# Patient Record
Sex: MALE | Race: WHITE | NOT HISPANIC OR LATINO | Employment: FULL TIME | URBAN - METROPOLITAN AREA
[De-identification: names, ages, dates, MRNs, and addresses within clinical notes are randomized per-mention and may not be internally consistent; named-entity substitution may affect disease eponyms.]

---

## 2021-12-30 ENCOUNTER — TREATMENT (OUTPATIENT)
Dept: PHYSICAL THERAPY | Facility: CLINIC | Age: 56
End: 2021-12-30

## 2021-12-30 DIAGNOSIS — M25.511 RIGHT SHOULDER PAIN, UNSPECIFIED CHRONICITY: ICD-10-CM

## 2021-12-30 DIAGNOSIS — M75.81 TENDINITIS OF RIGHT ROTATOR CUFF: Primary | ICD-10-CM

## 2021-12-30 PROCEDURE — 97161 PT EVAL LOW COMPLEX 20 MIN: CPT | Performed by: PHYSICAL THERAPIST

## 2021-12-30 NOTE — PROGRESS NOTES
Physical Therapy Initial Evaluation and Plan of Care    Patient: Mitch Garcia   : 1965  Diagnosis/ICD-10 Code:  Tendinitis of right rotator cuff [M75.81], Right shoulder pain, unspecified chronicity [M25.511]   Referring practitioner: Colleen Fishman MD    Functional Outcome Measure: QuickDASH: 25% disability (regular module), 19% disability (work module)     Subjective Evaluation    History of Present Illness  Mechanism of injury: Pt is a 55 y/o M coming to PT with increased R shoulder pain that he began noticing ~3 months ago. No specific injury/trauma reported. Over time he has noticed his R shoulder hurting more with movement.  Will get some referred pain into his R upper arm/anterior shoulder region.  Occasional pain into his R shoulder blade.  Denies N/T and no neck pain.  X-ray performed.  Pt was told he has tendonitis.  Having pain with lifting/moving arm at and above shoulder level.  Will have pain if he sleeps on it too long.       Patient Occupation:   Quality of life: good    Pain  Current pain ratin  At best pain ratin  At worst pain ratin  Location: R shoulder   Quality: discomfort, tight and dull ache  Relieving factors: rest, support and change in position  Aggravating factors: movement, lifting, overhead activity, prolonged positioning, sleeping, outstretched reach and repetitive movement  Progression: worsening    Social Support  Lives in: one-story house  Lives with: alone    Hand dominance: right    Diagnostic Tests  X-ray: abnormal    Treatments  Current treatment: injection treatment  Patient Goals  Patient goals for therapy: increased strength, decreased pain, increased motion, independence with ADLs/IADLs, return to work and return to sport/leisure activities             Objective          Postural Observations  Seated posture: fair  Standing posture: good        Palpation     Right Tenderness of the biceps, levator scapulae, middle trapezius, rhomboids and  "supraspinatus.     Tenderness     Right Shoulder  Tenderness in the AC joint, biceps tendon (proximal) and supraspinatus tendon.     Cervical/Thoracic Screen   Thoracic range of motion within normal limits with the following exceptions: Reduced thoracic extension. Had increased mid to upper thoracic flexion curvature.     Neurological Testing     Additional Neurological Details  WNL     Active Range of Motion   Left Shoulder   Normal active range of motion    Right Shoulder   Flexion: 153 degrees   Extension: WFL  Abduction: 180 degrees   External rotation 0°: WFL  Internal rotation BTB: T8     Joint Play     Right Shoulder  Hypomobile in the posterior capsule, inferior capsule, AC joint, thoracic spine and long axis distraction.     Strength/Myotome Testing     Left Shoulder   Normal muscle strength    Right Shoulder     Planes of Motion   Flexion: 4   Abduction: 4   External rotation at 0°: 4+   Internal rotation at 0°: 4     Isolated Muscles   Biceps: 5   Lower trapezius: 4   Middle trapezius: 4   Supraspinatus: 4+   Triceps: 4     Additional Strength Details  No pain with MMT, but could just \"feel\" something was there and different.     Tests     Right Shoulder   Positive Hawkin's.   Negative drop arm, empty can, Barbie/Avina, lift-off and Neer's.           Initial HEP :  Access Code: [email or web address]  URL: [email or web address]  Date: 12/30/2021  Prepared by: Maryann Walton    Exercises  Supine Shoulder Flexion Extension AAROM with Dowel - 1-2 x daily - 5 x weekly - 1 sets - 10 reps - 3-5 sec hold  Supine Shoulder External Rotation in 45 Degrees Abduction AAROM with Dowel - 1-2 x daily - 5 x weekly - 1 sets - 10 reps - 3-5 sec hold  Thoracic Extension Mobilization on Foam Roll - 2 x daily - 5 x weekly - 1 sets - 10 reps  Sleeper Stretch - 1-2 x daily - 5 x weekly - 1 sets - 10 reps - 5-10 sec hold  Corner Pec Major Stretch - 1-2 x daily - 5 x weekly - 3 sets - 20 sec hold    Assessment & Plan "     Assessment  Impairments: abnormal or restricted ROM, activity intolerance, impaired physical strength, lacks appropriate home exercise program and pain with function  Functional Limitations: carrying objects, lifting, sleeping, pulling, pushing, uncomfortable because of pain, reaching behind back, reaching overhead and unable to perform repetitive tasks  Assessment details: Patient is a 56 y.o.male who presents to therapy with increased R shoulder pain.  He presents with significant impairments listed above.  Impairments affect ADL/IADL's, functional activities, recreational activities, work activities, and community activities. Pt will benefit from skilled physical therapy to address impairments, decrease pain and restore function.     Prognosis: good    Goals  Plan Goals: Pt would benefit from skilled care for the listed deficits of the R shoulder.    SHORT TERM GOALS: Time for Goal Achievement: 3 weeks  1.  Patient to be compliant with and understand progression of HEP.   2.  Increase thoracic and (R) GH mobility to allow for improved mobility of shoulder with less pain.  3.  Increased (R) UE strength to 4+/5 to allow for household activities, including lifting.  4.  Pt to exhibit (R) shoulder active flexion/ABD AROM equal to LUE to assist with reaching overhead without pain.    LONG TERM GOALS: Time for Goal Achievement: 6 weeks  1.  Pt score < 15% perceived disability on Quick DASH.  2.  Pt. to exhibit (R) shoulder AROM to WFL to allow for reaching overhead and out (ABD) without pain limiting function.  3.  Pt to report pain at worst to a 1/10 with all work/home activities.   4.  Pt able to reach overhead and lift 20#, or greater, to allow for return to doing home/yard/recreational activities with min to no pain.      Plan  Therapy options: will be seen for skilled therapy services  Planned modality interventions: cryotherapy, electrical stimulation/Russian stimulation, TENS, thermotherapy (hydrocollator  packs) and dry needling  Planned therapy interventions: flexibility, functional ROM exercises, body mechanics training, home exercise program, joint mobilization, manual therapy, postural training, soft tissue mobilization, spinal/joint mobilization, strengthening, stretching and therapeutic activities  Frequency: 2x week  Duration in weeks: 6  Treatment plan discussed with: patient  Plan details: Progress ROM/strengthening/stabilization/functional activity as tolerated.           History # of Personal Factors and/or Comorbidities: LOW (0)  Examination of Body System(s): # of elements: LOW (1-2)  Clinical Presentation: STABLE   Clinical Decision Making: LOW     Timed:         Manual Therapy:         mins  40055;     Therapeutic Exercise:         mins  62411;     Neuromuscular Ulisses:        mins  28484;    Therapeutic Activity:          mins  15483;     Gait Training:           mins  59522;     Ultrasound:          mins  19065;    Ionto                                   mins   93625  Self Care                            mins   59012        Un-Timed:  Electrical Stimulation:         mins  74926 ( );  Dry Needling          mins self-pay  Traction          mins 98958  Low Eval     45     Mins  55932  Mod Eval          Mins  78569  High Eval                            Mins  01137  Re-Eval                               mins  35693        Timed Treatment:  0    mins   Total Treatment:     45   mins  PT SIGNATURE: Maryann Walton PT, DPT    DATE TREATMENT INITIATED: 12/30/2021    Initial Certification  Certification Period: 3/30/2022  I certify that the therapy services are furnished while this patient is under my care.  The services outlined above are required by this patient, and will be reviewed every 90 days.     PHYSICIAN: Colleen Fishman MD      DATE:     Please sign and return via fax to [phone].. Thank you, [facility].

## 2022-01-11 ENCOUNTER — TREATMENT (OUTPATIENT)
Dept: PHYSICAL THERAPY | Facility: CLINIC | Age: 57
End: 2022-01-11

## 2022-01-11 DIAGNOSIS — M75.81 TENDINITIS OF RIGHT ROTATOR CUFF: Primary | ICD-10-CM

## 2022-01-11 DIAGNOSIS — M25.511 RIGHT SHOULDER PAIN, UNSPECIFIED CHRONICITY: ICD-10-CM

## 2022-01-11 PROCEDURE — 97140 MANUAL THERAPY 1/> REGIONS: CPT | Performed by: PHYSICAL THERAPIST

## 2022-01-11 NOTE — PROGRESS NOTES
Physical Therapy Daily Progress Note    VISIT#: 2    Subjective   Mitch Garcia reports he is feeling ok today. He reports that he was a little sore after evaluation.  HEP going ok, but does feel a little soreness in his R shoulder with supine cane AAROM.     Pain Rating (0/10): 3    Objective     See Exercise, Manual, and Modality Logs for complete treatment.     Assessment/Plan  Patient with continued GH joint hypomobility.  Improved in ROM post manual therapy.  Performed IASTM and TPR to improve soft tissue mobility and to improve blood flow to tendinous structures.     Plan  Progress per Plan of Care            Timed:         Manual Therapy:    38     mins  84659;     Therapeutic Exercise:    5     mins  49485;     Neuromuscular Ulisses:        mins  07560;    Therapeutic Activity:          mins  17719;     Gait Training:           mins  18220;     Ultrasound:          mins  25820;    Ionto                                   mins   94364  Self Care                            mins   41655    Un-Timed:  Electrical Stimulation:         mins  59930 ( );  Dry Needling          mins self-pay  Traction          mins 50972  Low Eval          Mins  16513  Mod Eval          Mins  14468  High Eval                            Mins  34397  Re-Eval                               mins  36271    Timed Treatment:   43   mins   Total Treatment:     43   mins    Maryann Walton, PT, DPT  Physical Therapist

## 2022-01-13 ENCOUNTER — TREATMENT (OUTPATIENT)
Dept: PHYSICAL THERAPY | Facility: CLINIC | Age: 57
End: 2022-01-13

## 2022-01-13 DIAGNOSIS — M25.511 RIGHT SHOULDER PAIN, UNSPECIFIED CHRONICITY: ICD-10-CM

## 2022-01-13 DIAGNOSIS — M75.81 TENDINITIS OF RIGHT ROTATOR CUFF: Primary | ICD-10-CM

## 2022-01-13 PROCEDURE — 97110 THERAPEUTIC EXERCISES: CPT | Performed by: PHYSICAL THERAPIST

## 2022-01-13 PROCEDURE — 97140 MANUAL THERAPY 1/> REGIONS: CPT | Performed by: PHYSICAL THERAPIST

## 2022-01-13 NOTE — PROGRESS NOTES
Physical Therapy Daily Progress Note    VISIT#: 3    Subjective   Mitch Garcia reports he is doing ok today.  Felt really great for a couple days after last visit, greater than he has felt in several years.     Pain Rating (0/10): 3    Objective     See Exercise, Manual, and Modality Logs for complete treatment.     Assessment/Plan  Patient required cues to improve his scapular activation and reduce shoulder hiking during ROM activities.  Demonstrates improving ROM.      Plan  Progress per Plan of Care            Timed:         Manual Therapy:   20     mins  49695;     Therapeutic Exercise:    25     mins  86926;     Neuromuscular Ulisses:        mins  06750;    Therapeutic Activity:          mins  12256;     Gait Training:           mins  76301;     Ultrasound:          mins  07091;    Ionto                                   mins   33208  Self Care                            mins   33523    Un-Timed:  Electrical Stimulation:         mins  15614 ( );  Dry Needling          mins self-pay  Traction          mins 13023  Low Eval          Mins  18874  Mod Eval          Mins  35933  High Eval                            Mins  64242  Re-Eval                               mins  29107    Timed Treatment:   45   mins   Total Treatment:     45   mins    Maryann Walton, PT, DPT  Physical Therapist

## 2022-01-18 ENCOUNTER — TREATMENT (OUTPATIENT)
Dept: PHYSICAL THERAPY | Facility: CLINIC | Age: 57
End: 2022-01-18

## 2022-01-18 DIAGNOSIS — M75.81 TENDINITIS OF RIGHT ROTATOR CUFF: Primary | ICD-10-CM

## 2022-01-18 DIAGNOSIS — M25.511 RIGHT SHOULDER PAIN, UNSPECIFIED CHRONICITY: ICD-10-CM

## 2022-01-18 PROCEDURE — 97110 THERAPEUTIC EXERCISES: CPT | Performed by: PHYSICAL THERAPIST

## 2022-01-18 PROCEDURE — 97140 MANUAL THERAPY 1/> REGIONS: CPT | Performed by: PHYSICAL THERAPIST

## 2022-01-18 NOTE — PROGRESS NOTES
Physical Therapy Daily Progress Note    VISIT#: 4    Subjective   Mitch Garcia reports that he was sore following his last session but is doing well today with minimal pain.  Pain Rating (0/10): 1    Objective     See Exercise, Manual, and Modality Logs for complete treatment.     Assessment/Plan   Pt continues to progress ROM and activity tolerance with minimal to no increase in pain throughout the session.     Plan  Progress per Plan of Care and Progress strengthening /stabilization /functional activity            Timed:         Manual Therapy:    20     mins  25558;     Therapeutic Exercise:    25     mins  11568;     Neuromuscular Ulisses:        mins  06289;    Therapeutic Activity:          mins  54949;     Gait Training:           mins  25157;     Ultrasound:          mins  42317;    Ionto                                   mins   84485  Self Care                            mins   63778    Un-Timed:  Electrical Stimulation:         mins  62979 ( );  Dry Needling          mins self-pay  Traction          mins 06288  Low Eval          Mins  67265  Mod Eval          Mins  31142  High Eval                            Mins  02158  Re-Eval                               mins  34061    Timed Treatment:   45   mins   Total Treatment:     45   mins    Cony Jin PT, DPT  Physical Therapist

## 2022-01-20 ENCOUNTER — TREATMENT (OUTPATIENT)
Dept: PHYSICAL THERAPY | Facility: CLINIC | Age: 57
End: 2022-01-20

## 2022-01-20 DIAGNOSIS — M75.81 TENDINITIS OF RIGHT ROTATOR CUFF: Primary | ICD-10-CM

## 2022-01-20 DIAGNOSIS — M25.511 RIGHT SHOULDER PAIN, UNSPECIFIED CHRONICITY: ICD-10-CM

## 2022-01-20 PROCEDURE — 97110 THERAPEUTIC EXERCISES: CPT | Performed by: PHYSICAL THERAPIST

## 2022-01-20 PROCEDURE — 97140 MANUAL THERAPY 1/> REGIONS: CPT | Performed by: PHYSICAL THERAPIST

## 2022-01-20 NOTE — PROGRESS NOTES
Physical Therapy Daily Progress Note    VISIT#: 5    Subjective   Mitch Garcia reports he is doing ok. Did well after last visit.     Pain Rating (0/10): 2    Objective     See Exercise, Manual, and Modality Logs for complete treatment.   Pt was 15 min late for appt this morning.     Assessment/Plan  Patient with improving ROM.  Progressed with therex today and no pain increase.      Plan  Progress per Plan of Care            Timed:         Manual Therapy:   15      mins  06955;     Therapeutic Exercise:    15     mins  53331;     Neuromuscular Ulisses:        mins  20693;    Therapeutic Activity:          mins  66496;     Gait Training:           mins  16674;     Ultrasound:          mins  56331;    Ionto                                   mins   14459  Self Care                            mins   52991    Un-Timed:  Electrical Stimulation:         mins  11561 ( );  Dry Needling          mins self-pay  Traction          mins 47503  Low Eval          Mins  69887  Mod Eval          Mins  98541  High Eval                            Mins  60792  Re-Eval                               mins  22731    Timed Treatment:   30   mins   Total Treatment:     30   mins    Maryann Walton, PT, DPT  Physical Therapist

## 2022-04-25 ENCOUNTER — DOCUMENTATION (OUTPATIENT)
Dept: PHYSICAL THERAPY | Facility: CLINIC | Age: 57
End: 2022-04-25

## 2022-04-25 NOTE — PROGRESS NOTES
Discharge Summary  Discharge Summary from Physical Therapy Report      Date of Initial PT visit: 12/30/2021  Number of Visits Seen: 5     Discharge Status of Patient: Pt did not return after 1/20/22 visit.  Was doing well in therapy.      Goals: Partially Met    Discharge Plan: Continue with current home exercise program as instructed      Date of Discharge: 04/25/2022        Maryann Walton, PT, DPT  Physical Therapist